# Patient Record
Sex: FEMALE | Race: WHITE | NOT HISPANIC OR LATINO | Employment: OTHER | URBAN - METROPOLITAN AREA
[De-identification: names, ages, dates, MRNs, and addresses within clinical notes are randomized per-mention and may not be internally consistent; named-entity substitution may affect disease eponyms.]

---

## 2018-09-20 ENCOUNTER — HOSPITAL ENCOUNTER (EMERGENCY)
Facility: HOSPITAL | Age: 83
Discharge: HOME | End: 2018-09-21
Attending: EMERGENCY MEDICINE | Admitting: EMERGENCY MEDICINE

## 2018-09-20 ENCOUNTER — APPOINTMENT (OUTPATIENT)
Dept: GENERAL RADIOLOGY | Facility: HOSPITAL | Age: 83
End: 2018-09-20

## 2018-09-20 VITALS
DIASTOLIC BLOOD PRESSURE: 87 MMHG | OXYGEN SATURATION: 95 % | HEART RATE: 89 BPM | TEMPERATURE: 99 F | HEIGHT: 68 IN | SYSTOLIC BLOOD PRESSURE: 143 MMHG | RESPIRATION RATE: 16 BRPM | BODY MASS INDEX: 27.28 KG/M2 | WEIGHT: 180 LBS
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
## 2018-09-20 DIAGNOSIS — M25.551 BILATERAL HIP PAIN: Primary | ICD-10-CM

## 2018-09-20 DIAGNOSIS — M25.552 BILATERAL HIP PAIN: Primary | ICD-10-CM

## 2018-09-20 PROCEDURE — 72110 X-RAY EXAM L-2 SPINE 4/>VWS: CPT

## 2018-09-20 PROCEDURE — 99283 EMERGENCY DEPT VISIT LOW MDM: CPT

## 2018-09-20 PROCEDURE — 73523 X-RAY EXAM HIPS BI 5/> VIEWS: CPT

## 2018-09-20 RX ORDER — ASPIRIN 81 MG/1
81 TABLET ORAL DAILY
COMMUNITY

## 2018-09-20 RX ORDER — DOXAZOSIN MESYLATE 4 MG/1
4 TABLET ORAL DAILY
COMMUNITY

## 2018-09-20 RX ORDER — BUPROPION HYDROCHLORIDE 150 MG/1
150 TABLET ORAL DAILY
COMMUNITY

## 2018-09-20 RX ORDER — BISACODYL 10 MG
10 SUPPOSITORY, RECTAL RECTAL DAILY PRN
COMMUNITY

## 2018-09-20 RX ORDER — AMITRIPTYLINE HYDROCHLORIDE 50 MG/1
50 TABLET, FILM COATED ORAL NIGHTLY
COMMUNITY

## 2018-09-20 RX ORDER — AMLODIPINE BESYLATE 5 MG/1
5 TABLET ORAL DAILY
COMMUNITY

## 2018-09-20 RX ORDER — ERGOCALCIFEROL 1.25 MG/1
50000 CAPSULE ORAL DAILY
COMMUNITY

## 2018-09-20 RX ORDER — LEVOTHYROXINE SODIUM 88 UG/1
88 TABLET ORAL DAILY
COMMUNITY

## 2018-09-20 RX ORDER — IRON POLYSACCHARIDE COMPLEX 150 MG
150 CAPSULE ORAL DAILY
COMMUNITY

## 2018-09-20 RX ORDER — LOVASTATIN 40 MG/1
40 TABLET ORAL NIGHTLY
COMMUNITY

## 2018-09-20 RX ORDER — PAROXETINE 30 MG/1
30 TABLET, FILM COATED ORAL EVERY MORNING
COMMUNITY

## 2018-09-20 RX ORDER — ACETAMINOPHEN 325 MG/1
650 TABLET ORAL EVERY 4 HOURS PRN
COMMUNITY

## 2018-09-20 RX ORDER — QUETIAPINE 150 MG/1
150 TABLET, FILM COATED, EXTENDED RELEASE ORAL NIGHTLY
COMMUNITY

## 2018-09-20 RX ORDER — BUMETANIDE 2 MG/1
2 TABLET ORAL 2 TIMES DAILY
COMMUNITY

## 2018-09-20 RX ORDER — GLIMEPIRIDE 4 MG/1
4 TABLET ORAL 2 TIMES DAILY WITH MEALS
COMMUNITY

## 2018-09-20 RX ORDER — CALCITRIOL 0.25 UG/1
0.25 CAPSULE, LIQUID FILLED ORAL DAILY
COMMUNITY

## 2018-09-20 RX ORDER — TIMOLOL MALEATE 5 MG/ML
1 SOLUTION/ DROPS OPHTHALMIC 2 TIMES DAILY
COMMUNITY

## 2018-09-20 RX ORDER — TRAVOPROST OPHTHALMIC SOLUTION 0.04 MG/ML
1 SOLUTION OPHTHALMIC EVERY EVENING
COMMUNITY

## 2018-09-21 ENCOUNTER — APPOINTMENT (OUTPATIENT)
Dept: GENERAL RADIOLOGY | Facility: HOSPITAL | Age: 83
End: 2018-09-21

## 2018-09-21 ENCOUNTER — APPOINTMENT (OUTPATIENT)
Dept: CT IMAGING | Facility: HOSPITAL | Age: 83
End: 2018-09-21

## 2018-09-21 VITALS
OXYGEN SATURATION: 96 % | WEIGHT: 186 LBS | BODY MASS INDEX: 29.89 KG/M2 | HEIGHT: 66 IN | RESPIRATION RATE: 18 BRPM | TEMPERATURE: 98.3 F | SYSTOLIC BLOOD PRESSURE: 141 MMHG | DIASTOLIC BLOOD PRESSURE: 90 MMHG | HEART RATE: 93 BPM

## 2018-09-21 DIAGNOSIS — S20.212A RIB CONTUSION, LEFT, INITIAL ENCOUNTER: Primary | ICD-10-CM

## 2018-09-21 DIAGNOSIS — S70.02XA CONTUSION OF LEFT HIP, INITIAL ENCOUNTER: ICD-10-CM

## 2018-09-21 DIAGNOSIS — I27.20 PULMONARY HYPERTENSION (HCC): ICD-10-CM

## 2018-09-21 DIAGNOSIS — N18.9 CHRONIC RENAL IMPAIRMENT, UNSPECIFIED CKD STAGE: ICD-10-CM
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
## 2018-09-21 LAB
ALBUMIN SERPL-MCNC: 4.7 G/DL (ref 3.5–5.2)
ALBUMIN/GLOB SERPL: 1.5 G/DL
ALP SERPL-CCNC: 103 U/L (ref 39–117)
ALT SERPL W P-5'-P-CCNC: 29 U/L (ref 1–33)
ANION GAP SERPL CALCULATED.3IONS-SCNC: 14.4 MMOL/L
AST SERPL-CCNC: 35 U/L (ref 1–32)
BASOPHILS # BLD AUTO: 0.02 10*3/MM3 (ref 0–0.2)
BASOPHILS NFR BLD AUTO: 0.2 % (ref 0–1.5)
BILIRUB SERPL-MCNC: 0.5 MG/DL (ref 0.1–1.2)
BUN BLD-MCNC: 45 MG/DL (ref 8–23)
BUN/CREAT SERPL: 13 (ref 7–25)
CALCIUM SPEC-SCNC: 9.9 MG/DL (ref 8.6–10.5)
CHLORIDE SERPL-SCNC: 96 MMOL/L (ref 98–107)
CO2 SERPL-SCNC: 28.6 MMOL/L (ref 22–29)
CREAT BLD-MCNC: 3.45 MG/DL (ref 0.57–1)
D-LACTATE SERPL-SCNC: 0.7 MMOL/L (ref 0.5–2)
DEPRECATED RDW RBC AUTO: 48.6 FL (ref 37–54)
EOSINOPHIL # BLD AUTO: 0.09 10*3/MM3 (ref 0–0.7)
EOSINOPHIL NFR BLD AUTO: 1 % (ref 0.3–6.2)
ERYTHROCYTE [DISTWIDTH] IN BLOOD BY AUTOMATED COUNT: 15.1 % (ref 11.7–13)
GFR SERPL CREATININE-BSD FRML MDRD: 13 ML/MIN/1.73
GLOBULIN UR ELPH-MCNC: 3.2 GM/DL
GLUCOSE BLD-MCNC: 115 MG/DL (ref 65–99)
HCT VFR BLD AUTO: 37 % (ref 35.6–45.5)
HGB BLD-MCNC: 11.3 G/DL (ref 11.9–15.5)
IMM GRANULOCYTES # BLD: 0.02 10*3/MM3 (ref 0–0.03)
IMM GRANULOCYTES NFR BLD: 0.2 % (ref 0–0.5)
LYMPHOCYTES # BLD AUTO: 1.69 10*3/MM3 (ref 0.9–4.8)
LYMPHOCYTES NFR BLD AUTO: 18.9 % (ref 19.6–45.3)
MCH RBC QN AUTO: 26.8 PG (ref 26.9–32)
MCHC RBC AUTO-ENTMCNC: 30.5 G/DL (ref 32.4–36.3)
MCV RBC AUTO: 87.7 FL (ref 80.5–98.2)
MONOCYTES # BLD AUTO: 0.58 10*3/MM3 (ref 0.2–1.2)
MONOCYTES NFR BLD AUTO: 6.5 % (ref 5–12)
NEUTROPHILS # BLD AUTO: 6.55 10*3/MM3 (ref 1.9–8.1)
NEUTROPHILS NFR BLD AUTO: 73.4 % (ref 42.7–76)
PLATELET # BLD AUTO: 166 10*3/MM3 (ref 140–500)
PMV BLD AUTO: 14.4 FL (ref 6–12)
POTASSIUM BLD-SCNC: 3.5 MMOL/L (ref 3.5–5.2)
PROT SERPL-MCNC: 7.9 G/DL (ref 6–8.5)
RBC # BLD AUTO: 4.22 10*6/MM3 (ref 3.9–5.2)
SODIUM BLD-SCNC: 139 MMOL/L (ref 136–145)
WBC NRBC COR # BLD: 8.93 10*3/MM3 (ref 4.5–10.7)

## 2018-09-21 PROCEDURE — 99284 EMERGENCY DEPT VISIT MOD MDM: CPT

## 2018-09-21 PROCEDURE — 87040 BLOOD CULTURE FOR BACTERIA: CPT | Performed by: EMERGENCY MEDICINE

## 2018-09-21 PROCEDURE — 85025 COMPLETE CBC W/AUTO DIFF WBC: CPT | Performed by: EMERGENCY MEDICINE

## 2018-09-21 PROCEDURE — 71250 CT THORAX DX C-: CPT

## 2018-09-21 PROCEDURE — 73502 X-RAY EXAM HIP UNI 2-3 VIEWS: CPT

## 2018-09-21 PROCEDURE — 72125 CT NECK SPINE W/O DYE: CPT

## 2018-09-21 PROCEDURE — 80053 COMPREHEN METABOLIC PANEL: CPT | Performed by: EMERGENCY MEDICINE

## 2018-09-21 PROCEDURE — 83605 ASSAY OF LACTIC ACID: CPT | Performed by: EMERGENCY MEDICINE

## 2018-09-21 PROCEDURE — 70450 CT HEAD/BRAIN W/O DYE: CPT

## 2018-09-21 PROCEDURE — 36415 COLL VENOUS BLD VENIPUNCTURE: CPT

## 2018-09-21 PROCEDURE — 71101 X-RAY EXAM UNILAT RIBS/CHEST: CPT

## 2018-09-21 RX ORDER — SODIUM CHLORIDE 0.9 % (FLUSH) 0.9 %
10 SYRINGE (ML) INJECTION AS NEEDED
Status: DISCONTINUED | OUTPATIENT
Start: 2018-09-21 | End: 2018-09-21 | Stop reason: HOSPADM

## 2018-09-21 RX ORDER — TRAMADOL HYDROCHLORIDE 50 MG/1
50 TABLET ORAL ONCE
Status: COMPLETED | OUTPATIENT
Start: 2018-09-21 | End: 2018-09-21

## 2018-09-21 RX ADMIN — TRAMADOL HYDROCHLORIDE 50 MG: 50 TABLET, FILM COATED ORAL at 09:57

## 2018-09-21 NOTE — ED NOTES
Pt reports fall and c/o left thoracic back pain. Pt reports 7 falls in the last 24 hours. Pt was just seen at this ER several hours ago for the same complaint. Pt from Shahnaz Zeng, RN  09/21/18 3345

## 2018-09-21 NOTE — ED NOTES
Pt here from [facility] with c/o multiple falls today. Pt just arrived at NH last night, fell twice this morning with no injuries, but tonight was found on floor of room c/o bilateral hip pain.  Pt has bipolar, and is not oriented to time.  Spoke with ANTWAN Osborn, at NH, pt has refused all treatments and medications since arrival to NH.  Bed in low position, call light within reach, side rails up X2. Pt denies fever, chills, n/v/d, blurred vision, chest pain, abdominal pain, loss in control of bowel/bladder, numbness and/or tingling of the extremities at this time.        Geri Macias, RN  09/20/18 2054

## 2018-09-21 NOTE — ED NOTES
Spoke to Romy from [facility], advised patient had one run in front of her.       Christen Mccall, RN  09/21/18 0039

## 2018-09-21 NOTE — ED NOTES
"Eli Russo RN called from [facility] regarding patient. She states that she would like for us to place the pt on \"the behavioral floor instead of bringing her back to us\". I explained that she had been cleared for discharge. Eli states \"she needs extra help that we cannot give her here\". I explained that the ER is not an appropriate place in order to relocate this patient. I asked her if they had a care coordination nurse who could help the patient further and Eli states \"Yes, but they are out until Monday\". I explained that she would need to be evaluated on Monday for this. Eli states \"well the patient is just going to be back in the ER this weekend\".      Laureen Ramos RN  09/21/18 1708       Laureen Ramos RN  09/21/18 1704    "

## 2018-09-21 NOTE — ED PROVIDER NOTES
EMERGENCY DEPARTMENT ENCOUNTER    Room Number:  34/34  Date seen:  9/20/2018  Time seen: 9:54 PM  PCP: Swapna Pacheco MD  Historian: EMS      HPI:  Chief Complaint: Fall  A complete HPI/ROS/PMH/PSH/SH/FH are unobtainable due to: Poor historian. H/O dementia.   Context: Kesha Martinez is a 83 y.o. female who presents via EMS for multiple falls today. Pt denies any pain.    Pain Location: pt denies any pain  Radiation: none  Quality: pt denies any pain  Intensity/Severity: pt denies any pain  Timing: constant  Progression: unchanged      PAST MEDICAL HISTORY  Active Ambulatory Problems     Diagnosis Date Noted   • No Active Ambulatory Problems     Resolved Ambulatory Problems     Diagnosis Date Noted   • No Resolved Ambulatory Problems     Past Medical History:   Diagnosis Date   • Anemia    • Arthritis    • Bipolar disorder (CMS/HCC)    • CHF (congestive heart failure) (CMS/HCC)    • Diabetes mellitus (CMS/HCC)    • Disease of thyroid gland    • GERD (gastroesophageal reflux disease)    • Glaucoma    • Hyperlipidemia    • Hypertension    • Neuropathy    • Obstructive sleep apnea    • Renal disorder    • Stroke (CMS/HCC)          PAST SURGICAL HISTORY  Past Surgical History:   Procedure Laterality Date   • CARPAL TUNNEL RELEASE     • CHOLECYSTECTOMY     • COLON RESECTION     • COLONOSCOPY     • DILATATION AND CURETTAGE     • ENDOSCOPY     • FRACTURE SURGERY     • HERNIA REPAIR     • HYSTERECTOMY           FAMILY HISTORY  History reviewed. No pertinent family history.      SOCIAL HISTORY  Social History     Social History   • Marital status:      Spouse name: N/A   • Number of children: N/A   • Years of education: N/A     Occupational History   • Not on file.     Social History Main Topics   • Smoking status: Unknown If Ever Smoked   • Smokeless tobacco: Never Used   • Alcohol use No   • Drug use: No   • Sexual activity: Defer     Other Topics Concern   • Not on file     Social History Narrative   • No  narrative on file         ALLERGIES  Codeine        REVIEW OF SYSTEMS  Review of Systems   Unable to perform ROS: Other            PHYSICAL EXAM  ED Triage Vitals [09/20/18 2004]   Temp Heart Rate Resp BP SpO2   99 °F (37.2 °C) 91 18 170/98 95 %      Temp src Heart Rate Source Patient Position BP Location FiO2 (%)   Tympanic Monitor -- -- --         GENERAL: not distressed,   HENT: nares patent, atraumatic, FROM of the neck  EYES: no scleral icterus  CV: regular rhythm, regular rate  RESPIRATORY: normal effort, CTAB  ABDOMEN: soft, ntnd  MUSCULOSKELETAL: no deformity, no C,T,L midline tenderness  NEURO: alert, CHAVEZ, FC. Not oriented to place or date. Knows name only. ambulates without difficulty with minimal assistance  SKIN: warm, dry, bruising bilateral forearms without tenderness    Vital signs and nursing notes reviewed.      RADIOLOGY  XR Hips Bilateral With or Without Pelvis 5 View   Preliminary Result   No fracture or subluxation of the lumbar spine.    Old fractures of T11 and T12.       ----------------------------------------------------------------       2 VIEWS OF THE BILATERAL HIP, SINGLE VIEW OF THE PELVIS.       HISTORY: Fall, hip pain.       COMPARISON: No prior studies for comparison.       FINDINGS:   There is no fracture or dislocation. Mild bilateral osteoarthritis.       Soft tissue structures are unremarkable.       IMPRESSION:   No acute fracture or dislocation.              XR Spine Lumbar 4+ View   Preliminary Result   No fracture or subluxation of the lumbar spine.    Old fractures of T11 and T12.       ----------------------------------------------------------------       2 VIEWS OF THE BILATERAL HIP, SINGLE VIEW OF THE PELVIS.       HISTORY: Fall, hip pain.       COMPARISON: No prior studies for comparison.       FINDINGS:   There is no fracture or dislocation. Mild bilateral osteoarthritis.       Soft tissue structures are unremarkable.       IMPRESSION:   No acute fracture or  dislocation.                     Ordered the above noted radiological studies. Reviewed by me in PACS.        PROCEDURES  Procedures    MEDICATIONS GIVEN IN ER  Medications - No data to display                PROGRESS AND CONSULTS           MEDICAL DECISION MAKING      MDM  Number of Diagnoses or Management Options  Bilateral hip pain:   Fall, initial encounter:   Diagnosis management comments: Nurse called NH. They state patient fell twice. Witnessed. Did not hit head. Head is atraumatic on exam. XR negative. Pt is a terrible historian which limits exam. However, xray negative and she is ambulatory. I see no need for further advanced imaging.        Amount and/or Complexity of Data Reviewed  Tests in the radiology section of CPT®: ordered and reviewed (XR pelvis: No fx)  Decide to obtain previous medical records or to obtain history from someone other than the patient: yes ([facility] provided additional history)  Independent visualization of images, tracings, or specimens: yes (X ray negative)               DIAGNOSIS  Final diagnoses:   Bilateral hip pain   Fall, initial encounter         DISPOSITION  DISCHARGE    Patient discharged in stable condition.    FOLLOW-UP  Swapna Pacheco MD  [address] [facility] [id number] [phone]    Schedule an appointment as soon as possible for a visit   As needed         Medication List      No changes were made to your prescriptions during this visit.                   Latest Documented Vital Signs:  As of 10:17 PM  BP- 170/98 HR- 91 Temp- 99 °F (37.2 °C) (Tympanic) O2 sat- 95%        --  Documentation assistance provided by ariela Ayala for Dr. Shakeel MD.  Information recorded by the scribe was done at my direction and has been verified and validated by me.                 Steven Ayala  09/20/18 9309       Raúl Beckford II, MD  09/20/18 6980

## 2018-09-21 NOTE — ED PROVIDER NOTES
EMERGENCY DEPARTMENT ENCOUNTER    CHIEF COMPLAINT  Chief Complaint: fall  History given by: pt  History limited by: dementia  Room Number: 25/25  PMD: Swapna Pacheco MD      HPI:  Pt is a 83 y.o. female who has had several falls while walking without her walker over the past couple of days. Nursing home states that around 0700 today the pt fell on to the floor. Pt states that she fell hit her head and her left side. There was a questionable LOC with the fall. She states that her head hurts along with the left side of her chest. Pt denies nausea, abd pain, and dysuria.Pt states that she was not using her walker this morning.     Duration:  Since this morning  Onset: sudden  Intensity/Severity: moderate  Progression: worsened  Associated Symptoms: left chest pain, let hip pain, and headache.  Previous Episodes: Pt has h of falls.      PAST MEDICAL HISTORY  Active Ambulatory Problems     Diagnosis Date Noted   • No Active Ambulatory Problems     Resolved Ambulatory Problems     Diagnosis Date Noted   • No Resolved Ambulatory Problems     Past Medical History:   Diagnosis Date   • Anemia    • Arthritis    • Bipolar disorder (CMS/HCC)    • CHF (congestive heart failure) (CMS/HCC)    • Diabetes mellitus (CMS/HCC)    • Disease of thyroid gland    • GERD (gastroesophageal reflux disease)    • Glaucoma    • Hyperlipidemia    • Hypertension    • Neuropathy    • Obstructive sleep apnea    • Renal disorder    • Stroke (CMS/HCC)        PAST SURGICAL HISTORY  Past Surgical History:   Procedure Laterality Date   • CARPAL TUNNEL RELEASE     • CHOLECYSTECTOMY     • COLON RESECTION     • COLONOSCOPY     • DILATATION AND CURETTAGE     • ENDOSCOPY     • FRACTURE SURGERY     • HERNIA REPAIR     • HYSTERECTOMY         FAMILY HISTORY  History reviewed. No pertinent family history.    SOCIAL HISTORY  Social History     Social History   • Marital status:      Spouse name: N/A   • Number of children: N/A   • Years of education:  N/A     Occupational History   • Not on file.     Social History Main Topics   • Smoking status: Unknown If Ever Smoked   • Smokeless tobacco: Never Used   • Alcohol use No   • Drug use: No   • Sexual activity: Defer     Other Topics Concern   • Not on file     Social History Narrative   • No narrative on file       ALLERGIES  Codeine    REVIEW OF SYSTEMS  Review of Systems   Constitutional: Negative for fever.   HENT: Negative for sore throat.    Eyes: Negative.    Respiratory: Negative for cough and shortness of breath.    Cardiovascular: Positive for chest pain (left chest wall).   Gastrointestinal: Negative for abdominal pain, diarrhea and vomiting.   Genitourinary: Negative for dysuria.   Musculoskeletal: Negative for neck pain.        Left hip pan   Skin: Negative for rash.   Allergic/Immunologic: Negative.    Neurological: Positive for headaches. Negative for weakness and numbness.   Hematological: Negative.    Psychiatric/Behavioral: Negative.    All other systems reviewed and are negative.      PHYSICAL EXAM  ED Triage Vitals   Temp Heart Rate Resp BP SpO2   09/21/18 0847 09/21/18 0846 09/21/18 0846 09/21/18 0846 09/21/18 0846   98.3 °F (36.8 °C) 96 14 130/74 98 %      Temp src Heart Rate Source Patient Position BP Location FiO2 (%)   09/21/18 0847 09/21/18 0846 -- -- --   Tympanic Monitor          Physical Exam   Constitutional: She is oriented to person, place, and time. No distress.   HENT:   Head: Normocephalic and atraumatic.   Right Ear: External ear normal.   Left Ear: External ear normal.   Mouth/Throat: Mucous membranes are dry.   Eyes: Pupils are equal, round, and reactive to light. EOM are normal.   Neck: Normal range of motion. Neck supple.   Cardiovascular: Normal rate, regular rhythm and normal heart sounds.    Pulmonary/Chest: Effort normal and breath sounds normal. No respiratory distress. She exhibits tenderness (left chest ).   Abdominal: Soft. Bowel sounds are normal. She exhibits no  distension. There is no tenderness. There is no rebound and no guarding.   Musculoskeletal: Normal range of motion. She exhibits no edema.        Left hip: She exhibits tenderness.        Cervical back: She exhibits no tenderness.        Thoracic back: She exhibits no tenderness.   Tenderness with flexion   Neurological: She is alert and oriented to person, place, and time. She has normal sensation and normal strength.   Skin: Skin is warm and dry. No rash noted.   Psychiatric: Mood and affect normal.   Nursing note and vitals reviewed.    Lab Results    Lab Results (last 24 hours)     Procedure Component Value Units Date/Time    CBC & Differential [[id number]] Collected:  09/21/18 1106    Specimen:  Blood Updated:  09/21/18 1122    Narrative:       The following orders were created for panel order CBC & Differential.  Procedure                               Abnormality         Status                     ---------                               -----------         ------                     CBC Auto Differential[[id number]]        Abnormal            Final result                 Please view results for these tests on the individual orders.    Comprehensive Metabolic Panel [[id number]]  (Abnormal) Collected:  09/21/18 1106    Specimen:  Blood Updated:  09/21/18 1133     Glucose 115 (H) mg/dL      BUN 45 (H) mg/dL      Creatinine 3.45 (H) mg/dL      Sodium 139 mmol/L      Potassium 3.5 mmol/L      Chloride 96 (L) mmol/L      CO2 28.6 mmol/L      Calcium 9.9 mg/dL      Total Protein 7.9 g/dL      Albumin 4.70 g/dL      ALT (SGPT) 29 U/L      AST (SGOT) 35 (H) U/L      Alkaline Phosphatase 103 U/L      Total Bilirubin 0.5 mg/dL      eGFR Non African Amer 13 (L) mL/min/1.73      Globulin 3.2 gm/dL      A/G Ratio 1.5 g/dL      BUN/Creatinine Ratio 13.0     Anion Gap 14.4 mmol/L     Narrative:       The MDRD GFR formula is only valid for adults with stable renal function between ages 18 and 70.    Blood Culture - Blood,  [[id number]] Collected:  09/21/18 1106    Specimen:  Blood from Arm, Left Updated:  09/21/18 1114    Lactic Acid, Plasma [[id number]]  (Normal) Collected:  09/21/18 1106    Specimen:  Blood Updated:  09/21/18 1126     Lactate 0.7 mmol/L     CBC Auto Differential [[id number]]  (Abnormal) Collected:  09/21/18 1106    Specimen:  Blood Updated:  09/21/18 1122     WBC 8.93 10*3/mm3      RBC 4.22 10*6/mm3      Hemoglobin 11.3 (L) g/dL      Hematocrit 37.0 %      MCV 87.7 fL      MCH 26.8 (L) pg      MCHC 30.5 (L) g/dL      RDW 15.1 (H) %      RDW-SD 48.6 fl      MPV 14.4 (H) fL      Platelets 166 10*3/mm3      Neutrophil % 73.4 %      Lymphocyte % 18.9 (L) %      Monocyte % 6.5 %      Eosinophil % 1.0 %      Basophil % 0.2 %      Immature Grans % 0.2 %      Neutrophils, Absolute 6.55 10*3/mm3      Lymphocytes, Absolute 1.69 10*3/mm3      Monocytes, Absolute 0.58 10*3/mm3      Eosinophils, Absolute 0.09 10*3/mm3      Basophils, Absolute 0.02 10*3/mm3      Immature Grans, Absolute 0.02 10*3/mm3     Blood Culture - Blood, [[id number]] Collected:  09/21/18 1117    Specimen:  Blood from Arm, Left Updated:  09/21/18 1127          RADIOLOGY  CT Chest Without Contrast   Final Result       1. Enlargement of the main pulmonary artery, which can be seen in the   setting of pulmonary arterial hypertension.   2. No pulmonary nodules or masses are seen. Patient is noted to have   parenchymal scarring bilaterally.   3. Dilatation of the common bile duct. I suspect this is related to   prior cholecystectomy, as well as senescent change. However, correlation   with liver function tests is suggested.       Radiation dose reduction techniques were utilized, including automated   exposure control and exposure modulation based on body size.       This report was finalized on 9/21/2018 12:14 PM by Dr. Dilma Silverman M.D.          XR Ribs Left With PA Chest   Final Result      XR Hip With or Without Pelvis 2 - 3 View Left   Final Result       CT Head Without Contrast   Preliminary Result       There is mild small vessel disease in cerebral white matter. 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 The   remainder of the head CT is within normal limits. Specifically no acute   skull fracture or intracranial hemorrhage is identified.       CERVICAL SPINE CT        TECHNIQUE: Spiral CT images were obtained from the skull base down to   the T3 thoracic level and images were reformatted and submitted in 2 mm   thick axial CT section with bone and soft tissue algorithm. 2 mm thick   sagittal and coronal reconstructions were performed with soft tissue   algorithm and 1 mm thick sagittal and coronal reconstructions were   performed with bone algorithm.       FINDINGS: There are arthritic changes at the atlantodental interval with   marginal spurring off the anterior ring of C1 and the odontoid.   Otherwise, the C1-2 level is normal in appearance.       At C2-3 there is minimal facet overgrowth. The disc space is normal.   There is no canal or foraminal narrowing.       At C3-4 there is mild left and mild-to-moderate right facet overgrowth.   The disc space is normal. There is no canal or foraminal narrowing.       At C4-5 there is mild right and mild-to-moderate left facet overgrowth.   The posterior disc margin is normal. There is no canal or foraminal   narrowing at C4-5.       At C5-6 there is disc space narrowing and there are degenerative disc   and endplate changes and there is a diffuse posterior disc osteophyte   complex that abuts and deforms the ventral surface of the cord at least   mild-to-moderately narrowing the canal. There is bilateral uncovertebral   joint hypertrophy and only mild bilateral facet overgrowth and there is   moderate right and severe left bony  foraminal narrowing       At C6-7 there is disc space narrowing degenerative endplate change, a   diffuse posterior disc osteophyte complex mild-to-moderately narrows the   canal. There is  bilateral uncovertebral joint hypertrophy and only   minimal facet overgrowth and there is mild-to-moderate right and   moderate left bony foraminal narrowing.       At C7-T1 there is mild-to-moderate bilateral facet overgrowth. There is   a 2-3 mm degenerative anterolisthesis of C7 on T1. There is no canal   narrowing. There is mild bilateral bony foraminal narrowing.       IMPRESSION:       No acute fracture is seen in the cervical spine. There is cervical   spondylosis as described in great detail above.       Radiation dose reduction techniques were utilized, including automated   exposure control and exposure modulation based on body size.              CT Cervical Spine Without Contrast   Preliminary Result       There is mild small vessel disease in cerebral white matter. 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 The   remainder of the head CT is within normal limits. Specifically no acute   skull fracture or intracranial hemorrhage is identified.       CERVICAL SPINE CT        TECHNIQUE: Spiral CT images were obtained from the skull base down to   the T3 thoracic level and images were reformatted and submitted in 2 mm   thick axial CT section with bone and soft tissue algorithm. 2 mm thick   sagittal and coronal reconstructions were performed with soft tissue   algorithm and 1 mm thick sagittal and coronal reconstructions were   performed with bone algorithm.       FINDINGS: There are arthritic changes at the atlantodental interval with   marginal spurring off the anterior ring of C1 and the odontoid.   Otherwise, the C1-2 level is normal in  appearance.       At C2-3 there is minimal facet overgrowth. The disc space is normal.   There is no canal or foraminal narrowing.       At C3-4 there is mild left and mild-to-moderate right facet overgrowth.   The disc space is normal. There is no canal or foraminal narrowing.       At C4-5 there is mild right and mild-to-moderate left facet overgrowth.   The posterior disc margin is normal. There is no canal or foraminal   narrowing at C4-5.       At C5-6 there is disc space narrowing and there are degenerative disc   and endplate changes and there is a diffuse posterior disc osteophyte   complex that abuts and deforms the ventral surface of the cord at least   mild-to-moderately narrowing the canal. There is bilateral uncovertebral   joint hypertrophy and only mild bilateral facet overgrowth and there is   moderate right and severe left bony foraminal narrowing       At C6-7 there is disc space narrowing degenerative endplate change, a   diffuse posterior disc osteophyte complex mild-to-moderately narrows the   canal. There is  bilateral uncovertebral joint hypertrophy and only   minimal facet overgrowth and there is mild-to-moderate right and   moderate left bony foraminal narrowing.       At C7-T1 there is mild-to-moderate bilateral facet overgrowth. There is   a 2-3 mm degenerative anterolisthesis of C7 on T1. There is no canal   narrowing. There is mild bilateral bony foraminal narrowing.       IMPRESSION:       No acute fracture is seen in the cervical spine. There is cervical   spondylosis as described in great detail above.       Radiation dose reduction techniques were utilized, including automated   exposure control and exposure modulation based on body size.                   I ordered the above noted radiological studies. Interpreted by radiologist. Discussed with radiologist (Dr. Gonsalves). Reviewed by me in PACS.       PROCEDURES  Procedures      PROGRESS AND CONSULTS    0907- Ordered XR ribs, CT head,  XR of hip, Ultram for pain, and CT c-spine.    1308- Rechecked pt. Notified pt of radiology that shows pulmonary hypertension . Discussed the plan to discharge the pt. Pt understands and agrees with the plan, all questions answered.      MEDICAL DECISION MAKING  Results were reviewed/discussed with the patient and they were also made aware of online access. Pt also made aware that some labs, such as cultures, will not be resulted during ER visit and follow up with PMD is necessary.     MDM  Number of Diagnoses or Management Options     Amount and/or Complexity of Data Reviewed  Clinical lab tests: ordered and reviewed (Lactate- 0.7  Nothing acute)  Tests in the radiology section of CPT®: ordered and reviewed (CT head- No fractures or active bleeding  CT c-spine-No fractures   CXR  Nothing acute  Hip xr- nothing acute  )  Discussion of test results with the performing providers: yes (Dr. Gonsalves)  Decide to obtain previous medical records or to obtain history from someone other than the patient: yes  Review and summarize past medical records: yes (BUN and creatinine were a week ago.)  Independent visualization of images, tracings, or specimens: yes           DIAGNOSIS  Final diagnoses:   Rib contusion, left, initial encounter   Contusion of left hip, initial encounter   Pulmonary hypertension   Fall, initial encounter   Chronic renal impairment, unspecified CKD stage       DISPOSITION  DISCHARGE    Patient discharged in stable condition.    Reviewed implications of results, diagnosis, meds, responsibility to follow up, warning signs and symptoms of possible worsening, potential complications and reasons to return to ER.    Patient/Family voiced understanding of above instructions.    Discussed plan for discharge, as there is no emergent indication for admission. Patient referred to primary care provider for BP management due to today's BP. Pt/family is agreeable and understands need for follow up and repeat testing.  Pt  is aware that discharge does not mean that nothing is wrong but it indicates no emergency is present that requires admission and they must continue care with follow-up as given below or physician of their choice.     FOLLOW-UP  Swapna Pacheco MD  [address] [place] [address] [phone]    Schedule an appointment as soon as possible for a visit            Medication List      No changes were made to your prescriptions during this visit.           Latest Documented Vital Signs:  As of 1:03 PM  BP- 155/95 HR- 95 Temp- 98.3 °F (36.8 °C) (Tympanic) O2 sat- 100%    --  Documentation assistance provided by ariela Tobias for Dr. Delcid.  Information recorded by the scribe was done at my direction and has been verified and validated by me.            Mukund Tobias  09/21/18 1304       Mukund Tobias  09/21/18 1337       Satya Delcid MD  09/21/18 4743

## 2018-09-21 NOTE — ED NOTES
Pt taken to ED triage [facility] to await on EMS transport back to nursing home facility. Pt given call light for use when needing assistance, pt verbalized understanding of use of call light at this time. ED triage staff made aware.      Celia Lima RN  09/21/18 1552

## 2018-09-26 LAB
BACTERIA SPEC AEROBE CULT: NORMAL
BACTERIA SPEC AEROBE CULT: NORMAL

## 2018-09-28 ENCOUNTER — LAB REQUISITION (OUTPATIENT)
Dept: LAB | Facility: HOSPITAL | Age: 83
End: 2018-09-28

## 2018-09-28 DIAGNOSIS — Z00.00 ROUTINE GENERAL MEDICAL EXAMINATION AT A HEALTH CARE FACILITY: ICD-10-CM

## 2018-09-28 LAB — POTASSIUM BLD-SCNC: 3.2 MMOL/L (ref 3.5–5.2)

## 2018-09-28 PROCEDURE — 84132 ASSAY OF SERUM POTASSIUM: CPT
